# Patient Record
Sex: MALE | Race: WHITE | NOT HISPANIC OR LATINO | URBAN - METROPOLITAN AREA
[De-identification: names, ages, dates, MRNs, and addresses within clinical notes are randomized per-mention and may not be internally consistent; named-entity substitution may affect disease eponyms.]

---

## 2019-04-29 ENCOUNTER — OUTPATIENT (OUTPATIENT)
Dept: OUTPATIENT SERVICES | Facility: HOSPITAL | Age: 74
LOS: 1 days | End: 2019-04-29
Payer: MEDICARE

## 2019-04-29 VITALS
HEIGHT: 69 IN | TEMPERATURE: 98 F | WEIGHT: 154.98 LBS | HEART RATE: 64 BPM | SYSTOLIC BLOOD PRESSURE: 129 MMHG | RESPIRATION RATE: 16 BRPM | DIASTOLIC BLOOD PRESSURE: 77 MMHG

## 2019-04-29 DIAGNOSIS — Z87.2 PERSONAL HISTORY OF DISEASES OF THE SKIN AND SUBCUTANEOUS TISSUE: Chronic | ICD-10-CM

## 2019-04-29 DIAGNOSIS — Z87.448 PERSONAL HISTORY OF OTHER DISEASES OF URINARY SYSTEM: Chronic | ICD-10-CM

## 2019-04-29 DIAGNOSIS — N21.0 CALCULUS IN BLADDER: Chronic | ICD-10-CM

## 2019-04-29 DIAGNOSIS — G56.00 CARPAL TUNNEL SYNDROME, UNSPECIFIED UPPER LIMB: ICD-10-CM

## 2019-04-29 DIAGNOSIS — G56.01 CARPAL TUNNEL SYNDROME, RIGHT UPPER LIMB: ICD-10-CM

## 2019-04-29 DIAGNOSIS — Z01.818 ENCOUNTER FOR OTHER PREPROCEDURAL EXAMINATION: ICD-10-CM

## 2019-04-29 DIAGNOSIS — Z90.89 ACQUIRED ABSENCE OF OTHER ORGANS: Chronic | ICD-10-CM

## 2019-04-29 LAB
ALBUMIN SERPL ELPH-MCNC: 3.5 G/DL — SIGNIFICANT CHANGE UP (ref 3.3–5)
ALP SERPL-CCNC: 77 U/L — SIGNIFICANT CHANGE UP (ref 40–120)
ALT FLD-CCNC: 45 U/L — SIGNIFICANT CHANGE UP (ref 12–78)
ANION GAP SERPL CALC-SCNC: 6 MMOL/L — SIGNIFICANT CHANGE UP (ref 5–17)
AST SERPL-CCNC: 39 U/L — HIGH (ref 15–37)
BILIRUB SERPL-MCNC: 0.5 MG/DL — SIGNIFICANT CHANGE UP (ref 0.2–1.2)
BUN SERPL-MCNC: 33 MG/DL — HIGH (ref 7–23)
CALCIUM SERPL-MCNC: 8.7 MG/DL — SIGNIFICANT CHANGE UP (ref 8.5–10.1)
CHLORIDE SERPL-SCNC: 104 MMOL/L — SIGNIFICANT CHANGE UP (ref 96–108)
CO2 SERPL-SCNC: 28 MMOL/L — SIGNIFICANT CHANGE UP (ref 22–31)
CREAT SERPL-MCNC: 0.82 MG/DL — SIGNIFICANT CHANGE UP (ref 0.5–1.3)
GLUCOSE SERPL-MCNC: 94 MG/DL — SIGNIFICANT CHANGE UP (ref 70–99)
HCT VFR BLD CALC: 46.4 % — SIGNIFICANT CHANGE UP (ref 39–50)
HGB BLD-MCNC: 15.5 G/DL — SIGNIFICANT CHANGE UP (ref 13–17)
MCHC RBC-ENTMCNC: 29.6 PG — SIGNIFICANT CHANGE UP (ref 27–34)
MCHC RBC-ENTMCNC: 33.4 GM/DL — SIGNIFICANT CHANGE UP (ref 32–36)
MCV RBC AUTO: 88.5 FL — SIGNIFICANT CHANGE UP (ref 80–100)
NRBC # BLD: 0 /100 WBCS — SIGNIFICANT CHANGE UP (ref 0–0)
PLATELET # BLD AUTO: 203 K/UL — SIGNIFICANT CHANGE UP (ref 150–400)
POTASSIUM SERPL-MCNC: 4.4 MMOL/L — SIGNIFICANT CHANGE UP (ref 3.5–5.3)
POTASSIUM SERPL-SCNC: 4.4 MMOL/L — SIGNIFICANT CHANGE UP (ref 3.5–5.3)
PROT SERPL-MCNC: 6.9 G/DL — SIGNIFICANT CHANGE UP (ref 6–8.3)
RBC # BLD: 5.24 M/UL — SIGNIFICANT CHANGE UP (ref 4.2–5.8)
RBC # FLD: 13.6 % — SIGNIFICANT CHANGE UP (ref 10.3–14.5)
SODIUM SERPL-SCNC: 138 MMOL/L — SIGNIFICANT CHANGE UP (ref 135–145)
WBC # BLD: 6.6 K/UL — SIGNIFICANT CHANGE UP (ref 3.8–10.5)
WBC # FLD AUTO: 6.6 K/UL — SIGNIFICANT CHANGE UP (ref 3.8–10.5)

## 2019-04-29 PROCEDURE — G0463: CPT

## 2019-04-29 PROCEDURE — 36415 COLL VENOUS BLD VENIPUNCTURE: CPT

## 2019-04-29 PROCEDURE — 80053 COMPREHEN METABOLIC PANEL: CPT

## 2019-04-29 PROCEDURE — 85027 COMPLETE CBC AUTOMATED: CPT

## 2019-04-29 NOTE — H&P PST ADULT - NSICDXPASTSURGICALHX_GEN_ALL_CORE_FT
PAST SURGICAL HISTORY:  Bladder stone removal 2006    H/O pilonidal cyst 40 yrs ago    History of bladder stone Oct 2015, 7/11/16    History of tonsillectomy

## 2019-04-29 NOTE — H&P PST ADULT - NSICDXFAMILYHX_GEN_ALL_CORE_FT
FAMILY HISTORY:  Father  Still living? No  Family history of stomach cancer, Age at diagnosis: Age Unknown    Mother  Still living? No  Family history of colon cancer, Age at diagnosis: Age Unknown    Sibling  Still living? Yes, Estimated age: 69  Family history of heart disease, Age at diagnosis: Age Unknown

## 2019-04-29 NOTE — H&P PST ADULT - NSICDXPROBLEM_GEN_ALL_CORE_FT
PROBLEM DIAGNOSES  Problem: Carpal tunnel syndrome  Assessment and Plan: Check labs cbc cmp  ekg  medical clearance  Hibiclens and preop instructions were given  Patient to stop Multivitamin 5/1/19  Patient verbalizes understanding of instructions

## 2019-04-29 NOTE — H&P PST ADULT - HISTORY OF PRESENT ILLNESS
75 yo male scheduled for Right Hand Carpal Tunnel on 5/8/19 with Dr Boyle.  Patient states he has numbness and tingling for the past 2 yewars.  Patient is right hand dominant.

## 2019-04-29 NOTE — H&P PST ADULT - NSANTHOSAYNRD_GEN_A_CORE
No. GENI screening performed.  STOP BANG Legend: 0-2 = LOW Risk; 3-4 = INTERMEDIATE Risk; 5-8 = HIGH Risk

## 2019-04-29 NOTE — H&P PST ADULT - NSICDXPASTMEDICALHX_GEN_ALL_CORE_FT
PAST MEDICAL HISTORY:  Bladder stone 2006    Carpal tunnel syndrome right upper limb    Colon polyp     Enlarged prostate     Enlarged prostate with lower urinary tract symptoms (LUTS)     History of hypertension     Urinary retention

## 2019-04-30 PROBLEM — G56.00 CARPAL TUNNEL SYNDROME, UNSPECIFIED UPPER LIMB: Chronic | Status: ACTIVE | Noted: 2019-04-29

## 2019-05-07 ENCOUNTER — TRANSCRIPTION ENCOUNTER (OUTPATIENT)
Age: 74
End: 2019-05-07

## 2019-05-08 ENCOUNTER — OUTPATIENT (OUTPATIENT)
Dept: OUTPATIENT SERVICES | Facility: HOSPITAL | Age: 74
LOS: 1 days | End: 2019-05-08
Payer: MEDICARE

## 2019-05-08 VITALS
SYSTOLIC BLOOD PRESSURE: 129 MMHG | TEMPERATURE: 99 F | DIASTOLIC BLOOD PRESSURE: 81 MMHG | OXYGEN SATURATION: 97 % | WEIGHT: 154.98 LBS | HEART RATE: 75 BPM | HEIGHT: 69 IN | RESPIRATION RATE: 14 BRPM

## 2019-05-08 VITALS
RESPIRATION RATE: 18 BRPM | SYSTOLIC BLOOD PRESSURE: 126 MMHG | DIASTOLIC BLOOD PRESSURE: 70 MMHG | OXYGEN SATURATION: 99 % | HEART RATE: 66 BPM

## 2019-05-08 DIAGNOSIS — Z90.89 ACQUIRED ABSENCE OF OTHER ORGANS: Chronic | ICD-10-CM

## 2019-05-08 DIAGNOSIS — Z01.818 ENCOUNTER FOR OTHER PREPROCEDURAL EXAMINATION: ICD-10-CM

## 2019-05-08 DIAGNOSIS — G56.01 CARPAL TUNNEL SYNDROME, RIGHT UPPER LIMB: ICD-10-CM

## 2019-05-08 DIAGNOSIS — Z87.2 PERSONAL HISTORY OF DISEASES OF THE SKIN AND SUBCUTANEOUS TISSUE: Chronic | ICD-10-CM

## 2019-05-08 DIAGNOSIS — N21.0 CALCULUS IN BLADDER: Chronic | ICD-10-CM

## 2019-05-08 DIAGNOSIS — Z87.448 PERSONAL HISTORY OF OTHER DISEASES OF URINARY SYSTEM: Chronic | ICD-10-CM

## 2019-05-08 PROCEDURE — 64721 CARPAL TUNNEL SURGERY: CPT | Mod: RT

## 2019-05-08 RX ORDER — SODIUM CHLORIDE 9 MG/ML
1000 INJECTION, SOLUTION INTRAVENOUS
Qty: 0 | Refills: 0 | Status: DISCONTINUED | OUTPATIENT
Start: 2019-05-08 | End: 2019-05-23

## 2019-05-08 RX ORDER — SODIUM CHLORIDE 9 MG/ML
1000 INJECTION, SOLUTION INTRAVENOUS
Qty: 0 | Refills: 0 | Status: DISCONTINUED | OUTPATIENT
Start: 2019-05-08 | End: 2019-05-08

## 2019-05-08 RX ORDER — ONDANSETRON 8 MG/1
4 TABLET, FILM COATED ORAL ONCE
Qty: 0 | Refills: 0 | Status: DISCONTINUED | OUTPATIENT
Start: 2019-05-08 | End: 2019-05-08

## 2019-05-08 RX ORDER — CEFAZOLIN SODIUM 1 G
2000 VIAL (EA) INJECTION ONCE
Qty: 0 | Refills: 0 | Status: COMPLETED | OUTPATIENT
Start: 2019-05-08 | End: 2019-05-08

## 2019-05-08 RX ORDER — HYDROMORPHONE HYDROCHLORIDE 2 MG/ML
0.5 INJECTION INTRAMUSCULAR; INTRAVENOUS; SUBCUTANEOUS
Qty: 0 | Refills: 0 | Status: DISCONTINUED | OUTPATIENT
Start: 2019-05-08 | End: 2019-05-08

## 2019-05-08 RX ORDER — SODIUM CHLORIDE 9 MG/ML
3 INJECTION INTRAMUSCULAR; INTRAVENOUS; SUBCUTANEOUS ONCE
Qty: 0 | Refills: 0 | Status: COMPLETED | OUTPATIENT
Start: 2019-05-08 | End: 2019-05-08

## 2019-05-08 RX ADMIN — SODIUM CHLORIDE 3 MILLILITER(S): 9 INJECTION INTRAMUSCULAR; INTRAVENOUS; SUBCUTANEOUS at 11:59

## 2019-05-08 RX ADMIN — SODIUM CHLORIDE 30 MILLILITER(S): 9 INJECTION, SOLUTION INTRAVENOUS at 11:59

## 2019-05-08 RX ADMIN — SODIUM CHLORIDE 75 MILLILITER(S): 9 INJECTION, SOLUTION INTRAVENOUS at 16:53

## 2019-05-08 NOTE — ASU PREOP CHECKLIST - PATIENT SENT TO
pt BIBA for cough and diff breathing. Albuterol neb given at home without relief. Pt lung sounds diffuse wheezing, +belly breathing. Normal PO intake/urine output. operating room

## 2019-05-08 NOTE — ASU PATIENT PROFILE, ADULT - PMH
Bladder stone  2006  Carpal tunnel syndrome  right upper limb  Colon polyp    Enlarged prostate    Enlarged prostate with lower urinary tract symptoms (LUTS)    History of hypertension    Urinary retention

## 2019-05-08 NOTE — ASU DISCHARGE PLAN (ADULT/PEDIATRIC) - CARE PROVIDER_API CALL
Adam Boyle)  Orthopaedic Surgery; Surgery of the Hand  [address] [place] [address]  Phone: [phone]  Fax: [phone]  Follow Up Time: 2 weeks

## 2019-05-08 NOTE — ASU DISCHARGE PLAN (ADULT/PEDIATRIC) - ASU DC SPECIAL INSTRUCTIONSFT
1. Keep bandage on for 5 days. Then you can remove and get it wet. Otherwise cover hand with plastic bag for showering.    2. If you have a splint on, keep it on until you see Dr. Boyle in the office. Do not get the splint wet because it will break down and it is not good for your skin.    3. Elevate hand as much as possible and wiggle fingers as much as you can, as often as you think of it (if you are watching TV every commercial wiggle 10 times, or reading a book wiggle 10 times after every 5 pages read).    4. Walk plenty, no sitting around.    5. See Dr. Boyle in the office in about 2 weeks. Call to schedule. You will have your wound checked then, any sutures will be removed, and you splint will be changed if you have one on.

## 2019-05-08 NOTE — ASU DISCHARGE PLAN (ADULT/PEDIATRIC) - CALL YOUR DOCTOR IF YOU HAVE ANY OF THE FOLLOWING:
Fever greater than (need to indicate Fahrenheit or Celsius)/Bleeding that does not stop/Numbness, tingling, color or temperature change to extremity

## 2019-05-08 NOTE — ASU PATIENT PROFILE, ADULT - PSH
Bladder stone  removal 2006  H/O pilonidal cyst  40 yrs ago  History of bladder stone  Oct 2015, 7/11/16  History of tonsillectomy

## 2020-02-25 ENCOUNTER — OUTPATIENT (OUTPATIENT)
Dept: OUTPATIENT SERVICES | Facility: HOSPITAL | Age: 75
LOS: 1 days | End: 2020-02-25
Payer: MEDICARE

## 2020-02-25 VITALS
SYSTOLIC BLOOD PRESSURE: 136 MMHG | OXYGEN SATURATION: 98 % | DIASTOLIC BLOOD PRESSURE: 82 MMHG | HEART RATE: 75 BPM | RESPIRATION RATE: 16 BRPM | WEIGHT: 151.9 LBS | TEMPERATURE: 99 F | HEIGHT: 67 IN

## 2020-02-25 DIAGNOSIS — Z87.2 PERSONAL HISTORY OF DISEASES OF THE SKIN AND SUBCUTANEOUS TISSUE: Chronic | ICD-10-CM

## 2020-02-25 DIAGNOSIS — Z90.89 ACQUIRED ABSENCE OF OTHER ORGANS: Chronic | ICD-10-CM

## 2020-02-25 DIAGNOSIS — S46.012A STRAIN OF MUSCLE(S) AND TENDON(S) OF THE ROTATOR CUFF OF LEFT SHOULDER, INITIAL ENCOUNTER: ICD-10-CM

## 2020-02-25 DIAGNOSIS — G47.33 OBSTRUCTIVE SLEEP APNEA (ADULT) (PEDIATRIC): ICD-10-CM

## 2020-02-25 DIAGNOSIS — Z87.448 PERSONAL HISTORY OF OTHER DISEASES OF URINARY SYSTEM: Chronic | ICD-10-CM

## 2020-02-25 DIAGNOSIS — I10 ESSENTIAL (PRIMARY) HYPERTENSION: ICD-10-CM

## 2020-02-25 DIAGNOSIS — N21.0 CALCULUS IN BLADDER: Chronic | ICD-10-CM

## 2020-02-25 LAB
ANION GAP SERPL CALC-SCNC: 12 MMO/L — SIGNIFICANT CHANGE UP (ref 7–14)
BUN SERPL-MCNC: 31 MG/DL — HIGH (ref 7–23)
CALCIUM SERPL-MCNC: 9.3 MG/DL — SIGNIFICANT CHANGE UP (ref 8.4–10.5)
CHLORIDE SERPL-SCNC: 102 MMOL/L — SIGNIFICANT CHANGE UP (ref 98–107)
CO2 SERPL-SCNC: 24 MMOL/L — SIGNIFICANT CHANGE UP (ref 22–31)
CREAT SERPL-MCNC: 1.01 MG/DL — SIGNIFICANT CHANGE UP (ref 0.5–1.3)
GLUCOSE SERPL-MCNC: 80 MG/DL — SIGNIFICANT CHANGE UP (ref 70–99)
HCT VFR BLD CALC: 49.1 % — SIGNIFICANT CHANGE UP (ref 39–50)
HGB BLD-MCNC: 15.3 G/DL — SIGNIFICANT CHANGE UP (ref 13–17)
MCHC RBC-ENTMCNC: 28.8 PG — SIGNIFICANT CHANGE UP (ref 27–34)
MCHC RBC-ENTMCNC: 31.2 % — LOW (ref 32–36)
MCV RBC AUTO: 92.3 FL — SIGNIFICANT CHANGE UP (ref 80–100)
NRBC # FLD: 0 K/UL — SIGNIFICANT CHANGE UP (ref 0–0)
PLATELET # BLD AUTO: 248 K/UL — SIGNIFICANT CHANGE UP (ref 150–400)
PMV BLD: 10.7 FL — SIGNIFICANT CHANGE UP (ref 7–13)
POTASSIUM SERPL-MCNC: 4.1 MMOL/L — SIGNIFICANT CHANGE UP (ref 3.5–5.3)
POTASSIUM SERPL-SCNC: 4.1 MMOL/L — SIGNIFICANT CHANGE UP (ref 3.5–5.3)
RBC # BLD: 5.32 M/UL — SIGNIFICANT CHANGE UP (ref 4.2–5.8)
RBC # FLD: 13.6 % — SIGNIFICANT CHANGE UP (ref 10.3–14.5)
SODIUM SERPL-SCNC: 138 MMOL/L — SIGNIFICANT CHANGE UP (ref 135–145)
WBC # BLD: 7.27 K/UL — SIGNIFICANT CHANGE UP (ref 3.8–10.5)
WBC # FLD AUTO: 7.27 K/UL — SIGNIFICANT CHANGE UP (ref 3.8–10.5)

## 2020-02-25 PROCEDURE — 93010 ELECTROCARDIOGRAM REPORT: CPT

## 2020-02-25 NOTE — H&P PST ADULT - HISTORY OF PRESENT ILLNESS
This is a75 y.o. male with left shoulder pain , evaluated by Dr Saldana . Pt had shoulder x-ray , MRI done . Pt has strain of muscle and tendon of the rotator cuff of left shoulder ,initial encounter . Pt now for surgery . Pt offers no complaints in pst .

## 2020-02-25 NOTE — H&P PST ADULT - NSICDXPROBLEM_GEN_ALL_CORE_FT
PROBLEM DIAGNOSES  Problem: Strain of muscle(s) and tendon(s) of the rotator cuff of left shoulder, initial encounter  Assessment and Plan: Left shoulder arthroscopy ,subacromial decompression ,rotator cuff repair with exaprel   Medical clearance moise EKG comparison requested   chlorhexidine wash with verbal and written instructions , teach back appropriate     Problem: GENI (obstructive sleep apnea)  Assessment and Plan: stop bang positive ; OR faxed     Problem: Hypertension  Assessment and Plan: pt instructed to take irbesartan day of surgery , monitor BP during hospital stay .

## 2020-02-25 NOTE — H&P PST ADULT - MUSCULOSKELETAL
details… detailed exam left shoulder/no joint erythema/no joint swelling/no joint warmth/no calf tenderness/decreased ROM due to pain

## 2020-02-25 NOTE — H&P PST ADULT - RS GEN PE MLT RESP DETAILS PC
no wheezes/respirations non-labored/breath sounds equal/good air movement/clear to auscultation bilaterally/no rales/no rhonchi

## 2020-03-08 ENCOUNTER — TRANSCRIPTION ENCOUNTER (OUTPATIENT)
Age: 75
End: 2020-03-08

## 2020-03-09 ENCOUNTER — OUTPATIENT (OUTPATIENT)
Dept: OUTPATIENT SERVICES | Facility: HOSPITAL | Age: 75
LOS: 1 days | Discharge: HOME | End: 2020-03-09

## 2020-03-09 VITALS
HEART RATE: 85 BPM | DIASTOLIC BLOOD PRESSURE: 75 MMHG | SYSTOLIC BLOOD PRESSURE: 115 MMHG | OXYGEN SATURATION: 98 % | RESPIRATION RATE: 14 BRPM | TEMPERATURE: 98 F

## 2020-03-09 VITALS
HEART RATE: 75 BPM | HEIGHT: 67 IN | TEMPERATURE: 99 F | SYSTOLIC BLOOD PRESSURE: 136 MMHG | DIASTOLIC BLOOD PRESSURE: 82 MMHG | RESPIRATION RATE: 16 BRPM | WEIGHT: 151.9 LBS | OXYGEN SATURATION: 98 %

## 2020-03-09 DIAGNOSIS — S46.012A STRAIN OF MUSCLE(S) AND TENDON(S) OF THE ROTATOR CUFF OF LEFT SHOULDER, INITIAL ENCOUNTER: ICD-10-CM

## 2020-03-09 DIAGNOSIS — Z87.448 PERSONAL HISTORY OF OTHER DISEASES OF URINARY SYSTEM: Chronic | ICD-10-CM

## 2020-03-09 DIAGNOSIS — Z87.2 PERSONAL HISTORY OF DISEASES OF THE SKIN AND SUBCUTANEOUS TISSUE: Chronic | ICD-10-CM

## 2020-03-09 DIAGNOSIS — Z90.89 ACQUIRED ABSENCE OF OTHER ORGANS: Chronic | ICD-10-CM

## 2020-03-09 DIAGNOSIS — N21.0 CALCULUS IN BLADDER: Chronic | ICD-10-CM

## 2020-03-09 NOTE — ASU DISCHARGE PLAN (ADULT/PEDIATRIC) - FOLLOW UP APPOINTMENTS
[facility] ([facility]):

## 2020-03-09 NOTE — ASU DISCHARGE PLAN (ADULT/PEDIATRIC) - CALL YOUR DOCTOR IF YOU HAVE ANY OF THE FOLLOWING:
Pain not relieved by Medications/Wound/Surgical Site with redness, or foul smelling discharge or pus/Fever greater than (need to indicate Fahrenheit or Celsius)/Swelling that gets worse/Bleeding that does not stop

## 2020-03-09 NOTE — ASU DISCHARGE PLAN (ADULT/PEDIATRIC) - CARE PROVIDER_API CALL
Chema Saldana)  Orthopaedic Surgery  [facility] [place] [address]  Phone: [phone]  Fax: [phone]  Follow Up Time:

## 2020-12-01 ENCOUNTER — EMERGENCY (EMERGENCY)
Facility: HOSPITAL | Age: 75
LOS: 1 days | Discharge: HOME | End: 2020-12-01
Attending: EMERGENCY MEDICINE | Admitting: EMERGENCY MEDICINE
Payer: MEDICARE

## 2020-12-01 VITALS
RESPIRATION RATE: 16 BRPM | DIASTOLIC BLOOD PRESSURE: 75 MMHG | SYSTOLIC BLOOD PRESSURE: 132 MMHG | TEMPERATURE: 98 F | HEART RATE: 70 BPM | OXYGEN SATURATION: 98 %

## 2020-12-01 VITALS
SYSTOLIC BLOOD PRESSURE: 146 MMHG | HEART RATE: 79 BPM | OXYGEN SATURATION: 96 % | RESPIRATION RATE: 17 BRPM | DIASTOLIC BLOOD PRESSURE: 91 MMHG | TEMPERATURE: 98 F | HEIGHT: 67 IN | WEIGHT: 154.98 LBS

## 2020-12-01 DIAGNOSIS — Z87.448 PERSONAL HISTORY OF OTHER DISEASES OF URINARY SYSTEM: Chronic | ICD-10-CM

## 2020-12-01 DIAGNOSIS — N21.0 CALCULUS IN BLADDER: Chronic | ICD-10-CM

## 2020-12-01 DIAGNOSIS — Z87.2 PERSONAL HISTORY OF DISEASES OF THE SKIN AND SUBCUTANEOUS TISSUE: Chronic | ICD-10-CM

## 2020-12-01 DIAGNOSIS — Z90.89 ACQUIRED ABSENCE OF OTHER ORGANS: Chronic | ICD-10-CM

## 2020-12-01 PROCEDURE — 73502 X-RAY EXAM HIP UNI 2-3 VIEWS: CPT | Mod: 26,LT

## 2020-12-01 PROCEDURE — 73502 X-RAY EXAM HIP UNI 2-3 VIEWS: CPT

## 2020-12-01 PROCEDURE — 99283 EMERGENCY DEPT VISIT LOW MDM: CPT

## 2020-12-01 PROCEDURE — 99283 EMERGENCY DEPT VISIT LOW MDM: CPT | Mod: 25

## 2020-12-01 NOTE — ED PROVIDER NOTE - CLINICAL SUMMARY MEDICAL DECISION MAKING FREE TEXT BOX
left buttock and leg pain with ecchymosis after injury 8 days ago. suspect contusion. will x-ray to eval for fx. compartments soft, do not suspect compartment syndrome. ortho follow up. denies hitting head or LOC. do not suspect ICH or skull fx

## 2020-12-01 NOTE — ED PROVIDER NOTE - CARE PROVIDER_API CALL
León Chu (MD)  Orthopaedic Surgery  [address] [place] [address]  Phone: [phone]  Fax: [phone]  Follow Up Time: 1-3 Days

## 2020-12-01 NOTE — ED ADULT NURSE NOTE - CHPI ED NUR SYMPTOMS NEG
no fever/no tingling/no weakness/no abrasion/no numbness/no difficulty bearing weight/no stiffness/no deformity

## 2020-12-01 NOTE — ED PROVIDER NOTE - PROGRESS NOTE DETAILS
Reevaluated patient at bedside.  Patient feeling improved.  Discussed the results of all diagnostic testing in ED and copies of all reports given. full ROM. ambulatory with steady gait. recommend ice, elevate, motrin over the counter, and ortho follow up.    An opportunity to ask questions was given.  Discussed the importance of prompt, close medical follow-up.  Patient will return with any changes, concerns or persistent / worsening symptoms.  Understanding of all instructions verbalized.

## 2020-12-01 NOTE — ED PROVIDER NOTE - PATIENT PORTAL LINK FT
You can access the FollowMyHealth Patient Portal offered by [facility] by registering at the following website: [email or web address]. By joining [employer]’s FollowMyHealth portal, you will also be able to view your health information using other applications (apps) compatible with our system.

## 2020-12-01 NOTE — ED PROVIDER NOTE - ATTENDING CONTRIBUTION TO CARE
I have personally performed a face to face diagnostic evaluation on this patient.  I have reviewed the PA note and agree with the history, exam, and plan of care, except as noted.  History and Exam by me shows  left buttock pain after fall about 8-9 days ago c bruising.   pt is in nad. a n o x 3.  left buttock-  mild tenderness to inferior buttock c diffuse ecchymosis.  left hip from, nt.  Left knee- from.  xr no fx.

## 2020-12-01 NOTE — ED PROVIDER NOTE - OBJECTIVE STATEMENT
75 year old male with history of HTN presents with pain and bruising to left buttock and leg after injury 8 days ago. was lifting a platform bed to clean under it and fell onto left hip and leg. impact caused him to fall. denies hitting head or LOC. does not take any blood thinners. no neck or back pain. comes to ED because the pain continues and has not resolved (not worsening). also continues to have bruising to left buttock and leg. has been ambulatory without difficulty. has been taking occ motrin with unsure improvement of pain.   PCP Bill Garcia  ortho Ticker

## 2020-12-01 NOTE — ED ADULT NURSE NOTE - OBJECTIVE STATEMENT
pt aox4, " lt leg, buttocks area, pain, injury  a week ago," ecchymosis lt leg, ambulatory, steady gait, FROM< good cap refill

## 2020-12-01 NOTE — ED PROVIDER NOTE - MUSCULOSKELETAL, MLM
Spine appears normal, range of motion is not limited. no vert tenderness or step off deformities. full ROM of hip, knee, and ankle. mild soft tissue tenderness and ecchymosis to left buttock, thigh, and lower leg. compartments soft

## 2020-12-01 NOTE — ED ADULT TRIAGE NOTE - AS TEMP SITE
"Astigmatism is a vision condition that causes blurred vision due either to the irregular shape of the cornea, the clear front cover of the eye, or sometimes the curvature of the lens inside the eye. An irregular shaped cornea or lens prevents light from focusing properly on the retina, the light sensitive surface at the back of the eye. As a result, vision becomes blurred at any distance.    Astigmatism is a very common vision condition. Most people have some degree of astigmatism. Slight amounts of astigmatism usually don't affect vision and don't require treatment. However, larger amounts cause distorted or blurred vision, eye discomfort and headaches.    Astigmatism frequently occurs with other vision conditions like nearsightedness (myopia) and farsightedness (hyperopia). Together these vision conditions are referred to as refractive errors because they affect how the eyes bend or "refract" light.  The specific cause of astigmatism is unknown. It can be hereditary and is usually present from birth. It can change as a child grows and may decrease or worsen over time.    A comprehensive optometric examination will include testing for astigmatism. Depending on the amount present, your optometrist can provide eyeglasses or contact lenses that correct the astigmatism by altering the way light enters your eyes.        "
oral

## 2020-12-01 NOTE — ED ADULT TRIAGE NOTE - CHIEF COMPLAINT QUOTE
Pt states" wood frame from a platform bed feel on the leg, 8 days ago, c/o left gluteal  and left popiteal are pain, + ambulation.

## 2020-12-01 NOTE — ED PROVIDER NOTE - NSFOLLOWUPINSTRUCTIONS_ED_ALL_ED_FT
rest, ice, elevate  motrin over the counter for pain and inflammation, 600 mg three times a day with food   follow up with orthopedics if pain continues      Contusion      A contusion is a deep bruise. This is a result of an injury that causes bleeding under the skin. Symptoms of bruising include pain, swelling, and discolored skin. The skin may turn blue, purple, or yellow.      Follow these instructions at home:      Managing pain, stiffness, and swelling   You may use RICE. This stands for:  •Resting.      •Icing.      •Compression, or putting pressure.      •Elevating, or raising the injured area.    To follow this method, do these actions:  •Rest the injured area.    •If told, put ice on the injured area.  •Put ice in a plastic bag.      •Place a towel between your skin and the bag.      •Leave the ice on for 20 minutes, 2–3 times per day.        •If told, put light pressure (compression) on the injured area using an elastic bandage. Make sure the bandage is not too tight. If the area tingles or becomes numb, remove it and put it back on as told by your doctor.      •If possible, raise (elevate) the injured area above the level of your heart while you are sitting or lying down.      General instructions     •Take over-the-counter and prescription medicines only as told by your doctor.      •Keep all follow-up visits as told by your doctor. This is important.        Contact a doctor if:    •Your symptoms do not get better after several days of treatment.      •Your symptoms get worse.      •You have trouble moving the injured area.        Get help right away if:    •You have very bad pain.      •You have a loss of feeling (numbness) in a hand or foot.      •Your hand or foot turns pale or cold.        Summary    •A contusion is a deep bruise. This is a result of an injury that causes bleeding under the skin.      •Symptoms of bruising include pain, swelling, and discolored skin. The skin may turn blue, purple, or yellow.      •This condition is treated with rest, ice, compression, and elevation. This is also called RICE. You may be given over-the-counter medicines for pain.      •Contact a doctor if you do not feel better, or you feel worse. Get help right away if you have very bad pain, have lost feeling in a hand or foot, or the area turns pale or cold.      This information is not intended to replace advice given to you by your health care provider. Make sure you discuss any questions you have with your health care provider.

## 2020-12-01 NOTE — ED ADULT NURSE NOTE - CHPI ED NUR CONTEXT2
lmom to call us back about his symptoms and what foot is he having the flare up with. Okay per daniel   unknown

## 2021-02-08 ENCOUNTER — TRANSCRIPTION ENCOUNTER (OUTPATIENT)
Age: 76
End: 2021-02-08

## 2021-02-09 ENCOUNTER — FORM ENCOUNTER (OUTPATIENT)
Age: 76
End: 2021-02-09

## 2021-02-09 ENCOUNTER — TRANSCRIPTION ENCOUNTER (OUTPATIENT)
Age: 76
End: 2021-02-09

## 2021-02-10 ENCOUNTER — APPOINTMENT (OUTPATIENT)
Dept: DISASTER EMERGENCY | Facility: HOSPITAL | Age: 76
End: 2021-02-10

## 2021-02-10 ENCOUNTER — OUTPATIENT (OUTPATIENT)
Dept: OUTPATIENT SERVICES | Facility: HOSPITAL | Age: 76
LOS: 1 days | End: 2021-02-10
Payer: MEDICARE

## 2021-02-10 VITALS
RESPIRATION RATE: 18 BRPM | HEART RATE: 84 BPM | OXYGEN SATURATION: 96 % | DIASTOLIC BLOOD PRESSURE: 81 MMHG | SYSTOLIC BLOOD PRESSURE: 137 MMHG | WEIGHT: 149.91 LBS | HEIGHT: 69 IN | TEMPERATURE: 98 F

## 2021-02-10 VITALS
OXYGEN SATURATION: 98 % | TEMPERATURE: 98 F | RESPIRATION RATE: 18 BRPM | DIASTOLIC BLOOD PRESSURE: 89 MMHG | HEART RATE: 67 BPM | SYSTOLIC BLOOD PRESSURE: 143 MMHG

## 2021-02-10 DIAGNOSIS — Z87.2 PERSONAL HISTORY OF DISEASES OF THE SKIN AND SUBCUTANEOUS TISSUE: Chronic | ICD-10-CM

## 2021-02-10 DIAGNOSIS — N21.0 CALCULUS IN BLADDER: Chronic | ICD-10-CM

## 2021-02-10 DIAGNOSIS — Z90.89 ACQUIRED ABSENCE OF OTHER ORGANS: Chronic | ICD-10-CM

## 2021-02-10 DIAGNOSIS — Z87.448 PERSONAL HISTORY OF OTHER DISEASES OF URINARY SYSTEM: Chronic | ICD-10-CM

## 2021-02-10 DIAGNOSIS — U07.1 COVID-19: ICD-10-CM

## 2021-02-10 PROBLEM — Z00.00 ENCOUNTER FOR PREVENTIVE HEALTH EXAMINATION: Status: ACTIVE | Noted: 2021-02-10

## 2021-02-10 PROCEDURE — M0243: CPT

## 2021-02-10 RX ORDER — SODIUM CHLORIDE 9 MG/ML
250 INJECTION INTRAMUSCULAR; INTRAVENOUS; SUBCUTANEOUS
Refills: 0 | Status: DISCONTINUED | OUTPATIENT
Start: 2021-02-10 | End: 2021-02-25

## 2021-02-10 RX ADMIN — SODIUM CHLORIDE 25 MILLILITER(S): 9 INJECTION INTRAMUSCULAR; INTRAVENOUS; SUBCUTANEOUS at 13:54

## 2021-02-10 RX ADMIN — SODIUM CHLORIDE 25 MILLILITER(S): 9 INJECTION INTRAMUSCULAR; INTRAVENOUS; SUBCUTANEOUS at 14:53

## 2021-02-10 NOTE — CHART NOTE - NSCHARTNOTEFT_GEN_A_CORE
CC: Monoclonal Antibody Infusion/COVID 19 Positive  76yMale with PMHx hypertension and symptoms iarrhea and headache since 2/7, [facility]    exam/findings:  T(C): 36.5 (02-10-21 @ 13:45), Max: 36.5 (02-10-21 @ 13:45)  HR: 84 (02-10-21 @ 13:45) (84 - 84)  BP: 137/81 (02-10-21 @ 13:45) (137/81 - 137/81)  RR: 18 (02-10-21 @ 13:45) (18 - 18)  SpO2: 96% (02-10-21 @ 13:45) (96% - 96%)      PE: sitting in chair  Appearance: NAD	  HEENT:   Normal oral mucosa, laft eye with lateral gaze, sclera yellowish in color  Cardiovascular: Normal S1 S2, No JVD, No murmurs, No edema  Respiratory: Lungs clear to auscultation	  Gastrointestinal:  Soft, Non-tender	  Skin: warm and dry  Neurologic: Non-focal  Extremities: Normal range of motion, no calf tenderness or edema    ASSESSMENT:  Pt is a 75 yo male, Covid 19 Positive,   referred by Dr. Javier Gloria who presents to infusion center for Monoclonal antibody infusion (Casirivimab/Imdevimab)  Symptoms/ Criteria: headache and diarrhea since 2/7  Risk Profile includes: age >65, hypertension    PLAN:  - infusion procedure explained to patient   -Consent for monoclonal antibody infusion obtained   - Risk & benifits discussed/all questions answered  -infuse  Casirivimab/Imdevimab 1200mg/1200mg IV over one hour   -observe patient for one hour post infusion        I have reviewed the Casirivimab/Imdevimab Emergency Use Authorization (EAU) and I have provided the patient or patient's caregiver with the following information:  1. FDA has authorized emergency use of Casirivimab/Imdevimab, which is not FDA-approved biologic product.  2. The patient or patient's caregiver has the option to accept or refuse administration of Casirivimab/Imdevimab  3. The significant known and benefits are unknown.  4. Information on available alternative treatments and risks and benefits of those alternatives.    Discharge:  Patient tolerated infusion well denies complaints of chest pain/SOB/dizziness/ palps  Vital signs --- for discharge home ---  D/C instructions given/ fact sheet included.  Patient to follow-up with PCP as needed. CC: Monoclonal Antibody Infusion/COVID 19 Positive  76yMale with PMHx hypertension and symptoms iarrhea and headache since 2/7, [facility]    exam/findings:  T(C): 36.5 (02-10-21 @ 13:45), Max: 36.5 (02-10-21 @ 13:45)  HR: 84 (02-10-21 @ 13:45) (84 - 84)  BP: 137/81 (02-10-21 @ 13:45) (137/81 - 137/81)  RR: 18 (02-10-21 @ 13:45) (18 - 18)  SpO2: 96% (02-10-21 @ 13:45) (96% - 96%)      PE: sitting in chair  Appearance: NAD	  HEENT:   Normal oral mucosa, laft eye with lateral gaze, sclera yellowish in color  Cardiovascular: Normal S1 S2, No JVD, No murmurs, No edema  Respiratory: Lungs clear to auscultation	  Gastrointestinal:  Soft, Non-tender	  Skin: warm and dry  Neurologic: Non-focal  Extremities: Normal range of motion, no calf tenderness or edema    ASSESSMENT:  Pt is a 75 yo male, Covid 19 Positive,   referred by Dr. Javier Gloria who presents to infusion center for Monoclonal antibody infusion (Casirivimab/Imdevimab)  Symptoms/ Criteria: headache and diarrhea since 2/7  Risk Profile includes: age >65, hypertension    PLAN:  - infusion procedure explained to patient   -Consent for monoclonal antibody infusion obtained   - Risk & benifits discussed/all questions answered  -infuse  Casirivimab/Imdevimab 1200mg/1200mg IV over one hour   -observe patient for one hour post infusion        I have reviewed the Casirivimab/Imdevimab Emergency Use Authorization (EAU) and I have provided the patient or patient's caregiver with the following information:  1. FDA has authorized emergency use of Casirivimab/Imdevimab, which is not FDA-approved biologic product.  2. The patient or patient's caregiver has the option to accept or refuse administration of Casirivimab/Imdevimab  3. The significant known and benefits are unknown.  4. Information on available alternative treatments and risks and benefits of those alternatives.    Discharge:  Patient tolerated infusion well denies complaints of chest pain/SOB/dizziness/ palps  Vital signs stable for discharge home 4:00pm  D/C instructions given/ fact sheet included.  Patient to follow-up with PCP as needed.

## 2021-02-11 ENCOUNTER — TRANSCRIPTION ENCOUNTER (OUTPATIENT)
Age: 76
End: 2021-02-11

## 2022-04-16 NOTE — ED ADULT TRIAGE NOTE - ADDITIONAL SAFETY/BANDS...
Problem: Alcohol Withdrawal Management  Goal: # No alcohol-related delirium or seizures  Outcome: Outcome Met, Continue evaluating goal progress toward completion     Problem: At Risk for Falls  Goal: # Patient does not fall  Outcome: Outcome Met, Continue evaluating goal progress toward completion      Additional Safety/Bands:

## 2023-06-26 NOTE — H&P PST ADULT - NS SC CAGE ALCOHOL ANNOYED YOU
Lino Benton Patient Age: 80 year old  MESSAGE:   Patient calling, patient states his insurance will be covering the surgery, patient states his insurance needs a pre-auth, fx. [phone], please advise.      WEIGHT AND HEIGHT:   Wt Readings from Last 1 Encounters:   06/01/23 80.7 kg (177 lb 13.2 oz)     Ht Readings from Last 1 Encounters:   06/01/23 5' 7\" (1.702 m)     BMI Readings from Last 1 Encounters:   06/01/23 27.85 kg/m²       ALLERGIES:  Patient has no known allergies.  Current Outpatient Medications   Medication Sig Dispense Refill   • pioglitazone (ACTOS) 30 MG tablet Take 1 tablet by mouth daily. 100 tablet 1   • glipiZIDE (GLUCOTROL XL) 10 MG 24 hr tablet TAKE 1 TABLET BY MOUTH EVERY MORNING 90 tablet 0   • lisinopril-hydroCHLOROthiazide (ZESTORETIC) 20-12.5 MG per tablet TAKE 1 TABLET BY MOUTH DAILY 90 tablet 1   • DISPENSE 0.1 mLs by Intracavernosal route as needed (For ED not more than once daily). Dreyer Triple Mix 0.1ml 5 mL 11   • metFORMIN (GLUCOPHAGE) 500 MG tablet Take 1 tablet by mouth in the morning and 1 tablet in the evening. Take with meals. 200 tablet 1   • insulin detemir (Levemir) 100 UNIT/ML injectable solution Administer 45 units under the skin every morning 30 mL 5   • amLODIPine (NORVASC) 10 MG tablet Take 1 tablet by mouth daily. 100 tablet 3   • sildenafil (Viagra) 100 MG tablet Take 1 tablet by mouth as needed for Erectile Dysfunction. * Not more than once daily * 24 tablet 3   • omeprazole (PriLOSEC) 40 MG capsule TAKE 1 CAPSULE BY MOUTH DAILY 90 capsule 1   • famotidine (PEPCID) 20 MG tablet Take 20 mg by mouth daily.     • pravastatin (PRAVACHOL) 40 MG tablet Take 1 tablet by mouth daily. 100 tablet 1   • aluminum hydrox-magnesium hydrox-simethicone 400-400-40 MG/5ML suspension Every 4 Hours as needed for Heartburn     • Insulin Syringe 31G X 5/16\" 1 ML Misc USE TO INJECT LEVEMIR NIGHTLY 100 each 0   • blood glucose test strip test blood sugars BID - Dx code 250.00,  272.4     • aspirin 81 MG chewable tablet Chew by mouth daily.        No current facility-administered medications for this visit.     PHARMACY to use: see below          Pharmacy preference(s) on file:   [id number] - [place], [place] - [address] [facility] [address]  [address]  [facility] [address]  Phone: [phone] Fax: [phone]      CALL BACK INFO: Ok to leave response (including medical information) on answering machine  ROUTING: Patient's physician/staff        PCP: Tila Jacobs MD         INS: Payor: [employer] MEDICARE / Plan: MEDICAREPPO / Product Type: MEDICARE   PATIENT ADDRESS:  [address] [place] [id number]   no

## 2024-06-05 ENCOUNTER — EMERGENCY (EMERGENCY)
Facility: HOSPITAL | Age: 79
LOS: 1 days | Discharge: HOME | End: 2024-06-05
Attending: EMERGENCY MEDICINE | Admitting: EMERGENCY MEDICINE
Payer: MEDICARE

## 2024-06-05 VITALS
DIASTOLIC BLOOD PRESSURE: 88 MMHG | SYSTOLIC BLOOD PRESSURE: 135 MMHG | HEART RATE: 88 BPM | RESPIRATION RATE: 16 BRPM | TEMPERATURE: 98 F | HEIGHT: 69 IN | OXYGEN SATURATION: 95 % | WEIGHT: 156.09 LBS

## 2024-06-05 DIAGNOSIS — Z90.89 ACQUIRED ABSENCE OF OTHER ORGANS: Chronic | ICD-10-CM

## 2024-06-05 DIAGNOSIS — Z87.448 PERSONAL HISTORY OF OTHER DISEASES OF URINARY SYSTEM: Chronic | ICD-10-CM

## 2024-06-05 DIAGNOSIS — N21.0 CALCULUS IN BLADDER: Chronic | ICD-10-CM

## 2024-06-05 DIAGNOSIS — Z87.2 PERSONAL HISTORY OF DISEASES OF THE SKIN AND SUBCUTANEOUS TISSUE: Chronic | ICD-10-CM

## 2024-06-05 LAB
ALBUMIN SERPL ELPH-MCNC: 3.9 G/DL — SIGNIFICANT CHANGE UP (ref 3.3–5)
ALP SERPL-CCNC: 77 U/L — SIGNIFICANT CHANGE UP (ref 30–120)
ALT FLD-CCNC: 70 U/L — HIGH (ref 10–60)
ANION GAP SERPL CALC-SCNC: 9 MMOL/L — SIGNIFICANT CHANGE UP (ref 5–17)
APTT BLD: 26.4 SEC — SIGNIFICANT CHANGE UP (ref 24.5–35.6)
AST SERPL-CCNC: 60 U/L — HIGH (ref 10–40)
BASOPHILS # BLD AUTO: 0.02 K/UL — SIGNIFICANT CHANGE UP (ref 0–0.2)
BASOPHILS NFR BLD AUTO: 0.2 % — SIGNIFICANT CHANGE UP (ref 0–2)
BILIRUB SERPL-MCNC: 0.6 MG/DL — SIGNIFICANT CHANGE UP (ref 0.2–1.2)
BUN SERPL-MCNC: 30 MG/DL — HIGH (ref 7–23)
CALCIUM SERPL-MCNC: 9.4 MG/DL — SIGNIFICANT CHANGE UP (ref 8.4–10.5)
CHLORIDE SERPL-SCNC: 99 MMOL/L — SIGNIFICANT CHANGE UP (ref 96–108)
CO2 SERPL-SCNC: 27 MMOL/L — SIGNIFICANT CHANGE UP (ref 22–31)
CREAT SERPL-MCNC: 0.98 MG/DL — SIGNIFICANT CHANGE UP (ref 0.5–1.3)
EGFR: 78 ML/MIN/1.73M2 — SIGNIFICANT CHANGE UP
EOSINOPHIL # BLD AUTO: 0.05 K/UL — SIGNIFICANT CHANGE UP (ref 0–0.5)
EOSINOPHIL NFR BLD AUTO: 0.4 % — SIGNIFICANT CHANGE UP (ref 0–6)
GLUCOSE SERPL-MCNC: 104 MG/DL — HIGH (ref 70–99)
HCT VFR BLD CALC: 47.9 % — SIGNIFICANT CHANGE UP (ref 39–50)
HGB BLD-MCNC: 15.9 G/DL — SIGNIFICANT CHANGE UP (ref 13–17)
IMM GRANULOCYTES NFR BLD AUTO: 0.2 % — SIGNIFICANT CHANGE UP (ref 0–0.9)
INR BLD: 1.01 RATIO — SIGNIFICANT CHANGE UP (ref 0.85–1.18)
LYMPHOCYTES # BLD AUTO: 0.72 K/UL — LOW (ref 1–3.3)
LYMPHOCYTES # BLD AUTO: 6.5 % — LOW (ref 13–44)
MCHC RBC-ENTMCNC: 29.3 PG — SIGNIFICANT CHANGE UP (ref 27–34)
MCHC RBC-ENTMCNC: 33.2 GM/DL — SIGNIFICANT CHANGE UP (ref 32–36)
MCV RBC AUTO: 88.4 FL — SIGNIFICANT CHANGE UP (ref 80–100)
MONOCYTES # BLD AUTO: 0.7 K/UL — SIGNIFICANT CHANGE UP (ref 0–0.9)
MONOCYTES NFR BLD AUTO: 6.3 % — SIGNIFICANT CHANGE UP (ref 2–14)
NEUTROPHILS # BLD AUTO: 9.65 K/UL — HIGH (ref 1.8–7.4)
NEUTROPHILS NFR BLD AUTO: 86.4 % — HIGH (ref 43–77)
NRBC # BLD: 0 /100 WBCS — SIGNIFICANT CHANGE UP (ref 0–0)
PLATELET # BLD AUTO: 234 K/UL — SIGNIFICANT CHANGE UP (ref 150–400)
POTASSIUM SERPL-MCNC: 4.3 MMOL/L — SIGNIFICANT CHANGE UP (ref 3.5–5.3)
POTASSIUM SERPL-SCNC: 4.3 MMOL/L — SIGNIFICANT CHANGE UP (ref 3.5–5.3)
PROT SERPL-MCNC: 7.1 G/DL — SIGNIFICANT CHANGE UP (ref 6–8.3)
PROTHROM AB SERPL-ACNC: 11 SEC — SIGNIFICANT CHANGE UP (ref 9.5–13)
RBC # BLD: 5.42 M/UL — SIGNIFICANT CHANGE UP (ref 4.2–5.8)
RBC # FLD: 13.6 % — SIGNIFICANT CHANGE UP (ref 10.3–14.5)
SODIUM SERPL-SCNC: 135 MMOL/L — SIGNIFICANT CHANGE UP (ref 135–145)
WBC # BLD: 11.16 K/UL — HIGH (ref 3.8–10.5)
WBC # FLD AUTO: 11.16 K/UL — HIGH (ref 3.8–10.5)

## 2024-06-05 PROCEDURE — 13132 CMPLX RPR F/C/C/M/N/AX/G/H/F: CPT

## 2024-06-05 PROCEDURE — 99285 EMERGENCY DEPT VISIT HI MDM: CPT

## 2024-06-05 PROCEDURE — 99285 EMERGENCY DEPT VISIT HI MDM: CPT | Mod: 25

## 2024-06-05 PROCEDURE — 70486 CT MAXILLOFACIAL W/O DYE: CPT | Mod: MC

## 2024-06-05 PROCEDURE — 72125 CT NECK SPINE W/O DYE: CPT | Mod: 26,MC

## 2024-06-05 PROCEDURE — 85610 PROTHROMBIN TIME: CPT

## 2024-06-05 PROCEDURE — 70486 CT MAXILLOFACIAL W/O DYE: CPT | Mod: 26,MC

## 2024-06-05 PROCEDURE — 13152 CMPLX RPR E/N/E/L 2.6-7.5 CM: CPT

## 2024-06-05 PROCEDURE — 96361 HYDRATE IV INFUSION ADD-ON: CPT | Mod: XU

## 2024-06-05 PROCEDURE — 85025 COMPLETE CBC W/AUTO DIFF WBC: CPT

## 2024-06-05 PROCEDURE — 72125 CT NECK SPINE W/O DYE: CPT | Mod: MC

## 2024-06-05 PROCEDURE — 36415 COLL VENOUS BLD VENIPUNCTURE: CPT

## 2024-06-05 PROCEDURE — 96374 THER/PROPH/DIAG INJ IV PUSH: CPT | Mod: XU

## 2024-06-05 PROCEDURE — 13133 CMPLX RPR F/C/C/M/N/AX/G/H/F: CPT

## 2024-06-05 PROCEDURE — 96375 TX/PRO/DX INJ NEW DRUG ADDON: CPT | Mod: XU

## 2024-06-05 PROCEDURE — 85730 THROMBOPLASTIN TIME PARTIAL: CPT

## 2024-06-05 PROCEDURE — 70450 CT HEAD/BRAIN W/O DYE: CPT | Mod: MC

## 2024-06-05 PROCEDURE — 70450 CT HEAD/BRAIN W/O DYE: CPT | Mod: 26,MC

## 2024-06-05 PROCEDURE — 80053 COMPREHEN METABOLIC PANEL: CPT

## 2024-06-05 RX ORDER — ONDANSETRON 8 MG/1
4 TABLET, FILM COATED ORAL ONCE
Refills: 0 | Status: COMPLETED | OUTPATIENT
Start: 2024-06-05 | End: 2024-06-05

## 2024-06-05 RX ORDER — SODIUM CHLORIDE 9 MG/ML
1000 INJECTION INTRAMUSCULAR; INTRAVENOUS; SUBCUTANEOUS ONCE
Refills: 0 | Status: COMPLETED | OUTPATIENT
Start: 2024-06-05 | End: 2024-06-05

## 2024-06-05 RX ORDER — MORPHINE SULFATE 50 MG/1
4 CAPSULE, EXTENDED RELEASE ORAL ONCE
Refills: 0 | Status: DISCONTINUED | OUTPATIENT
Start: 2024-06-05 | End: 2024-06-05

## 2024-06-05 RX ORDER — BACITRACIN 500 [USP'U]/G
1 OINTMENT OPHTHALMIC
Qty: 1 | Refills: 0
Start: 2024-06-05 | End: 2024-06-11

## 2024-06-05 RX ORDER — OXYCODONE AND ACETAMINOPHEN 5; 325 MG/1; MG/1
1 TABLET ORAL
Qty: 10 | Refills: 0
Start: 2024-06-05

## 2024-06-05 RX ADMIN — ONDANSETRON 4 MILLIGRAM(S): 8 TABLET, FILM COATED ORAL at 17:22

## 2024-06-05 RX ADMIN — SODIUM CHLORIDE 1000 MILLILITER(S): 9 INJECTION INTRAMUSCULAR; INTRAVENOUS; SUBCUTANEOUS at 18:25

## 2024-06-05 RX ADMIN — SODIUM CHLORIDE 1000 MILLILITER(S): 9 INJECTION INTRAMUSCULAR; INTRAVENOUS; SUBCUTANEOUS at 17:22

## 2024-06-05 RX ADMIN — MORPHINE SULFATE 4 MILLIGRAM(S): 50 CAPSULE, EXTENDED RELEASE ORAL at 18:25

## 2024-06-05 RX ADMIN — MORPHINE SULFATE 4 MILLIGRAM(S): 50 CAPSULE, EXTENDED RELEASE ORAL at 17:22

## 2024-06-05 NOTE — ED ADULT NURSE NOTE - OBJECTIVE STATEMENT
80 y/o male received axo4 ambulatory c/o fall while entering the gym's shower. pt reports that he slipped on the wet floor and hit his face against the faucet. hematoma noted to left periorbital area, laceration to forehead. denies LOC>

## 2024-06-05 NOTE — ED PROVIDER NOTE - PROVIDER TOKENS
PROVIDER:[TOKEN:[[id number]]],PROVIDER:[TOKEN:[[id number]]],PROVIDER:[TOKEN:[[id number]]]

## 2024-06-05 NOTE — ED PROVIDER NOTE - OBJECTIVE STATEMENT
79-year-old male with history of hypertension presents with was at the gym today, going to the shower.  The patient slipped on the wet floor, and fell.  The patient hit his face on the control to the shower.  Patient complains of a laceration to his forehead and his left cheek.  No loss of consciousness.  Mild headache.  Mild neck pain.  No numbness or tingling the arms or legs.  No truncal trauma.  No extremity injury.  No aggravating or alleviating factors otherwise noted.  No other acute injury or complaints.

## 2024-06-05 NOTE — ED ADULT TRIAGE NOTE - CHIEF COMPLAINT QUOTE
80 y/o male presents axo4 ambulatory bibems for fall eval. pt reports slip and fall in wet shower floor at the gym hitting his face on the faucets. denies loc. laceration to forehead and bruising to left periorbital area.

## 2024-06-05 NOTE — ED PROVIDER NOTE - MUSCULOSKELETAL, MLM
Spine appears normal, No midline spinal tenderness or paraspinal tenderness in cervical, thoracic, or lumbar. range of motion is not limited, no muscle or joint tenderness

## 2024-06-05 NOTE — ED PROVIDER NOTE - PATIENT PORTAL LINK FT
You can access the FollowMyHealth Patient Portal offered by [facility] by registering at the following website: [email or web address]. By joining [employer]’s FollowMyHealth portal, you will also be able to view your health information using other applications (apps) compatible with our system.

## 2024-06-05 NOTE — ED PROVIDER NOTE - PROGRESS NOTE DETAILS
Discussed with patient regarding the nature of his lacerations, and the need for laceration repair.  The patient request plastic surgeon.  He is aware of the separate billing.  Discussed with Dr. Roberts, will see patient. Discussed with orthopedic PA, they will coordinate with Dr. Shane, and will get back to us.    Patient is neurologically intact, now in cervical collar. Discussed with Ortho PA, states that Dr. Shane says that we should call Dr. Nieto, Dr Nieto paged Discussed with orthopedics Dr. Nieto.  He states that the patient can be discharged home, he will see the patient in the office on Monday.  The patient should continue wearing the cervical collar as we placed. At length discussion with the patient and the patient's son about the nature of his fracture, importance of close prompt follow-up.  The patient will follow-up with orthopedics soon as possible, and as well he will follow-up with plastic surgeon.  Discussed the importance of compliance with the cervical collar, and his risks of vascular injury, cervical spine injury if he does not use the collar continuously.  They understand the risks, and will follow-up with the orthopedist soon as possible.    Patient seen and sutured by Dr. Loja.  The patient can follow-up with him in the office next week, bacitracin ophthalmic, p.o. antibiotics as the wound may have impacted to the bone.  Discussed with patient regarding infection precautions as well.

## 2024-06-05 NOTE — ED PROCEDURE NOTE - CPROC ED LACER REPAIR DETAIL1
The wound was explored to base in bloodless field./All foreign material was removed./Extensive debridement was performed.

## 2024-06-05 NOTE — ED PROCEDURE NOTE - PROCEDURE ADDITIONAL DETAILS
5 LACERATIONS  1. FOREHEAD (X2) LENGTH = 7 CM  2. LEFT CHEEK (X2) LENGTH = 5.5 CM  3. LEFT UPPER EYELID = 3 CM  4. LEFT LOWER EYELID = 4 CM  THE FOREHEAD LACERATIONS WERE DEEP DOWN TO BONE  EYELID LACERATIONS MACERATED TISSUE  ALL WOUNDS JUDICIOUSLY DEBRIDED  TISSUE MOBILIZED ON ALL SITES AFTER DEBRIDEMENT OF MACERATED TISSUE PERFORMED  COMPLEX CLOSURE ON ALL LACERATIONIS  PATIENT CAN SHOWER TOMORROW, BACITRACIN AND BACITRACIN OPTHALMIC OINTMENT APPLIED 3 X DAILY  BSS EYEDROPS  PAIN MEDS AND ANTIBX ORDERED BY ER STAFF  FOLLOW UP IN OFFICE IN ONE WEEK

## 2024-06-05 NOTE — ED PROVIDER NOTE - CARE PLAN
1 Principal Discharge DX:	C6 cervical fracture  Secondary Diagnosis:	Facial laceration, initial encounter

## 2024-06-05 NOTE — ED PROVIDER NOTE - NSFOLLOWUPINSTRUCTIONS_ED_ALL_ED_FT
1.  Follow-up with your Primary Medical doctor. Call today / next business day for prompt follow-up.  2.   Return to Emergency room for any worsening or persistent pain, dizziness, difficulty walking, feeling unsteady, vomiting, visual changes, numbness, tingling, any unknown fluid coming out of nose or ears, any changes in your speech,  worsening or persistent headaches,  weakness, fever, or any other concerning symptoms.  3.   See attached instruction sheets for additional information, including information regarding signs and symptoms to look out for, reasons to seek immediate care and other important instructions.  4.  You should avoid any activities where you may hit your head until cleared by your doctor, at least two weeks.  5.   Follow-up with orthopedic surgeon.  Call tomorrow to arrange follow-up for Monday.  6.  You must wear the cervical collar at all times, while you risk paralysis, stroke or other severe injury as we discussed  7.  Bacitracin ophthalmic to the wound twice daily  8.  Duricef twice daily for 7 days 1.  Follow-up with your Primary Medical doctor. Call today / next business day for prompt follow-up.  2.   Return to Emergency room for any worsening or persistent pain, dizziness, difficulty walking, feeling unsteady, vomiting, visual changes, numbness, tingling, any unknown fluid coming out of nose or ears, any changes in your speech,  worsening or persistent headaches,  weakness, fever, or any other concerning symptoms.  3.   See attached instruction sheets for additional information, including information regarding signs and symptoms to look out for, reasons to seek immediate care and other important instructions.  4.  You should avoid any activities where you may hit your head until cleared by your doctor, at least two weeks.  5.   Follow-up with orthopedic surgeon.  Call tomorrow to arrange follow-up for Monday.  6.  You must wear the cervical collar at all times, while you risk paralysis, stroke or other severe injury as we discussed  7.  Bacitracin ophthalmic to the wound twice daily  8.  Duricef twice daily for 7 days  9.  Percocet as needed for pain, no driving or operating machinery

## 2024-06-05 NOTE — ED PROVIDER NOTE - CLINICAL SUMMARY MEDICAL DECISION MAKING FREE TEXT BOX
Mechanical fall, facial injury.  Will check CT head and face, CT cervical spine, with plastic surgery for laceration repair

## 2024-06-05 NOTE — ED PROVIDER NOTE - PHYSICAL EXAMINATION
+2 cm mid forehead laceration, mild separation.  +3 cm laceration left infra orbital area.  Minimal tenderness to the left maxilla.  No crepitus.  No other signs of facial or head trauma. +2 cm mid forehead laceration, mild separation.  +3 cm laceration left infra orbital area.  Minimal tenderness to the left maxilla.  No crepitus.  No other signs of facial or head trauma.    3 cm laceration left eyelid

## 2024-06-05 NOTE — ED PROVIDER NOTE - DIFFERENTIAL DIAGNOSIS
Differential Diagnosis Rule out acute intracranial hemorrhage, facial laceration, cervical spine fracture, facial fracture

## 2024-06-05 NOTE — ED PROVIDER NOTE - CARE PROVIDERS DIRECT ADDRESSES
[email or web address]_[email or web address],DirectAddress_Unknown

## 2024-06-05 NOTE — ED ADULT NURSE NOTE - AS SC BRADEN MOISTURE
(4) rarely moist Odomzo Counseling- I discussed with the patient the risks of Odomzo including but not limited to nausea, vomiting, diarrhea, constipation, weight loss, changes in the sense of taste, decreased appetite, muscle spasms, and hair loss.  The patient verbalized understanding of the proper use and possible adverse effects of Odomzo.  All of the patient's questions and concerns were addressed.

## 2024-06-05 NOTE — ED ADULT NURSE NOTE - NSFALLUNIVINTERV_ED_ALL_ED
Bed/Stretcher in lowest position, wheels locked, appropriate side rails in place/Call bell, personal items and telephone in reach/Instruct patient to call for assistance before getting out of bed/chair/stretcher/Non-slip footwear applied when patient is off stretcher/[place] to call system/Physically safe environment - no spills, clutter or unnecessary equipment/Purposeful proactive rounding/Room/bathroom lighting operational, light cord in reach

## 2024-06-10 ENCOUNTER — APPOINTMENT (OUTPATIENT)
Dept: ORTHOPEDIC SURGERY | Facility: CLINIC | Age: 79
End: 2024-06-10

## 2024-11-13 ENCOUNTER — APPOINTMENT (OUTPATIENT)
Dept: ORTHOPEDIC SURGERY | Facility: CLINIC | Age: 79
End: 2024-11-13
Payer: MEDICARE

## 2024-11-13 VITALS — WEIGHT: 146 LBS | BODY MASS INDEX: 21.62 KG/M2 | HEIGHT: 69 IN

## 2024-11-13 DIAGNOSIS — M12.811 UNSPECIFIED ROTATOR CUFF TEAR OR RUPTURE OF RIGHT SHOULDER, NOT SPECIFIED AS TRAUMATIC: ICD-10-CM

## 2024-11-13 DIAGNOSIS — I10 ESSENTIAL (PRIMARY) HYPERTENSION: ICD-10-CM

## 2024-11-13 DIAGNOSIS — Z78.9 OTHER SPECIFIED HEALTH STATUS: ICD-10-CM

## 2024-11-13 DIAGNOSIS — Z63.4 DISAPPEARANCE AND DEATH OF FAMILY MEMBER: ICD-10-CM

## 2024-11-13 DIAGNOSIS — M75.101 UNSPECIFIED ROTATOR CUFF TEAR OR RUPTURE OF RIGHT SHOULDER, NOT SPECIFIED AS TRAUMATIC: ICD-10-CM

## 2024-11-13 DIAGNOSIS — M75.121 COMPLETE ROTATOR CUFF TEAR OR RUPTURE OF RIGHT SHOULDER, NOT SPECIFIED AS TRAUMATIC: ICD-10-CM

## 2024-11-13 PROCEDURE — 99204 OFFICE O/P NEW MOD 45 MIN: CPT

## 2024-11-13 PROCEDURE — 73030 X-RAY EXAM OF SHOULDER: CPT | Mod: RT

## 2024-11-13 PROCEDURE — 73010 X-RAY EXAM OF SHOULDER BLADE: CPT | Mod: RT

## 2024-11-13 RX ORDER — ESCITALOPRAM OXALATE 10 MG/1
10 TABLET, FILM COATED ORAL
Refills: 0 | Status: ACTIVE | COMMUNITY

## 2024-11-13 RX ORDER — QUETIAPINE FUMARATE 25 MG/1
25 TABLET ORAL
Refills: 0 | Status: ACTIVE | COMMUNITY

## 2024-11-13 RX ORDER — IRBESARTAN 150 MG/1
150 TABLET ORAL
Refills: 0 | Status: ACTIVE | COMMUNITY

## 2024-11-13 SDOH — SOCIAL STABILITY - SOCIAL INSECURITY: DISSAPEARANCE AND DEATH OF FAMILY MEMBER: Z63.4

## 2025-01-08 ENCOUNTER — APPOINTMENT (OUTPATIENT)
Dept: ORTHOPEDIC SURGERY | Facility: CLINIC | Age: 80
End: 2025-01-08

## 2025-01-16 NOTE — ASU PREOPERATIVE ASSESSMENT, ADULT ([facility] ONLY) - HARM RISK FACTORS
no dermatitis, no environmental allergies, no food allergies, no immunosuppressive disorder, and no pruritus.
The patient is a 29y Male complaining of allergic reaction.
no

## 2025-05-02 NOTE — ED ADULT NURSE NOTE - CHIEF COMPLAINT QUOTE
for wheezing, and start Prednisone 40mg burst x 5 days.   - POC Covid and Flu and Strep all negative in office.   - Will consider CXR if Sx's not improved after steroid burst.   - patient appears HDS, clinically stable on exam. Counseled on maintaining adequate daily hydration.   - No acute cardio-resp Sx's.   - Provided ER precautions. Patient understands.   - f/u 3 months for physical   Orders:  - AMB POC COVID-19 & INFLUENZA A/B  - albuterol sulfate HFA (PROVENTIL;VENTOLIN;PROAIR) 108 (90 Base) MCG/ACT inhaler; Inhale 2 puffs into the lungs every 4 hours as needed for Wheezing or Shortness of Breath  Dispense: 18 g; Refill: 1    2. Sore throat  - Plan per #1  Orders:  - POCT rapid strep A    3. Wheezing  - Plan per #1  Orders:  - predniSONE (DELTASONE) 20 MG tablet; Take 1 tablet by mouth 2 times daily for 5 days  Dispense: 10 tablet; Refill: 0      EMR Dragon/transcription disclaimer:  Much of this encounter note is electronic transcription/translation of spoken language to printed texts.  The electronic translation of spoken language may be erroneous, or at times, nonsensical words or phrases may be inadvertently transcribed.  Although I have reviewed the note for such errors, some may still exist.      
80 y/o male presents axo4 ambulatory bibems for fall eval. pt reports slip and fall in wet shower floor at the gym hitting his face on the faucets. denies loc. laceration to forehead and bruising to left periorbital area.

## 2025-09-17 ENCOUNTER — NON-APPOINTMENT (OUTPATIENT)
Age: 80
End: 2025-09-17